# Patient Record
Sex: MALE | Race: WHITE | Employment: UNEMPLOYED | URBAN - METROPOLITAN AREA
[De-identification: names, ages, dates, MRNs, and addresses within clinical notes are randomized per-mention and may not be internally consistent; named-entity substitution may affect disease eponyms.]

---

## 2019-03-30 ENCOUNTER — HOSPITAL ENCOUNTER (EMERGENCY)
Age: 29
Discharge: HOME | End: 2019-03-30
Payer: MEDICARE

## 2019-03-30 VITALS
WEIGHT: 220 LBS | SYSTOLIC BLOOD PRESSURE: 146 MMHG | HEIGHT: 70 IN | OXYGEN SATURATION: 99 % | DIASTOLIC BLOOD PRESSURE: 98 MMHG | TEMPERATURE: 98.2 F | BODY MASS INDEX: 31.5 KG/M2 | RESPIRATION RATE: 16 BRPM | HEART RATE: 63 BPM

## 2019-03-30 DIAGNOSIS — M54.50 ACUTE BILATERAL LOW BACK PAIN WITHOUT SCIATICA: Primary | ICD-10-CM

## 2019-03-30 PROCEDURE — 99282 EMERGENCY DEPT VISIT SF MDM: CPT

## 2019-03-30 RX ORDER — NAPROXEN 500 MG/1
500 TABLET ORAL 2 TIMES DAILY
Qty: 15 TABLET | Refills: 0 | Status: SHIPPED | OUTPATIENT
Start: 2019-03-30

## 2019-03-30 ASSESSMENT — PAIN DESCRIPTION - PAIN TYPE: TYPE: ACUTE PAIN;CHRONIC PAIN

## 2019-03-30 ASSESSMENT — PAIN DESCRIPTION - LOCATION: LOCATION: BACK

## 2019-03-30 ASSESSMENT — PAIN SCALES - GENERAL: PAINLEVEL_OUTOF10: 8

## 2019-03-30 ASSESSMENT — PAIN DESCRIPTION - ORIENTATION: ORIENTATION: LOWER

## 2019-03-30 NOTE — LETTER
[facility] Emergency Department  [address] [place] [address]  Phone: [phone]  Fax: [phone]               March 30, 2019    Patient: Sedrick Joyce   YOB: 1990   Date of Visit: 3/30/2019       To Whom It May Concern:    Sedrick Joyce was seen and treated in our emergency department on 3/30/2019. He may return to work on 4/1/19.       Sincerely,       Kirk Vázquez PA-C         Signature:__________________________________

## 2019-03-30 NOTE — ED PROVIDER NOTES
eMERGENCY dEPARTMENT eNCOUnter      PCP: Maryellen Clayton MD    [facility]    Chief Complaint   Patient presents with    Back Pain     low back pain, intermittent, for a long time, pt has Cerebral Palsy, has been seeing a Chiropractor x 2 weeks       HPI    Young Argueta is a 29 y.o. male who presents with back pain, located in the low back region. The onset was yesterday. Context is patient states she's had off and on back pain for years. Patient states back pain flared up yesterday with no known cause. 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 Patient states he took Tylenol which has helped with pain . The duration has been since the onset. The quality of the pain is achy and sharp. The pain does not radiate. The pain worsens with movement. No known alleviating factors. Patient has history of cerebral palsy, denies any changes in extremity weakness or gait. REVIEW OF SYSTEMS    Constitutional:  Denies fever, chills. Denies history of IVDA. Cardiovascular:  Denies chest pain, palpitations or swelling  Respiratory:  Denies cough or shortness of breath    GI:  Denies abdominal pain, nausea, vomiting, or diarrhea  :  Denies any urinary symptoms  Musculoskeletal:  See HPI above   Skin:  Denies rash  Neurologic:   No bowel incontinence or bladder retention, No saddle anesthesia, No radicular symptoms. No lower extremity weakness or altered sensation.   Endocrine:  Denies polyuria or polydypsia   Lymphatic:  Denies swollen glands     All other review of systems are negative  See HPI and nursing notes for additional information       PAST MEDICAL & SURGICAL HISTORY    Past Medical History:   Diagnosis Date    Bipolar 1 disorder ([address]     Cerebral palsy ([address]      Past Surgical History:   Procedure Laterality Date    CYST REMOVAL      right face    INGUINAL HERNIA REPAIR Left        CURRENT MEDICATIONS    Current Outpatient Rx Medication Sig Dispense Refill    naproxen (NAPROSYN) 500 MG tablet Take 1 tablet by mouth 2 times daily 15 tablet 0    ondansetron (ZOFRAN ODT) 4 MG disintegrating tablet Take 1 tablet by mouth every 6 hours 10 tablet 1    famotidine (PEPCID) 20 MG tablet Take 1 tablet by mouth 2 times daily 60 tablet 1    albuterol sulfate HFA (PROVENTIL HFA) 108 (90 BASE) MCG/ACT inhaler Inhale 2 puffs into the lungs every 4 hours as needed for Wheezing or Shortness of Breath With spacer (and mask if indicated). Thanks.  1 Inhaler 1       ALLERGIES    Allergies   Allergen Reactions    Percocet [Oxycodone-Acetaminophen] Itching       SOCIAL HISTORY    Social History     Socioeconomic History    Marital status: Single     Spouse name: None    Number of children: None    Years of education: None    Highest education level: None   Occupational History    Occupation:  for [employer]   Social Needs    Financial resource strain: None    Food insecurity:     Worry: None     Inability: None    Transportation needs:     Medical: None     Non-medical: None   Tobacco Use    Smoking status: Former Smoker     Types: Cigarettes     Last attempt to quit: 8/24/2015     Years since quitting: 3.6    Smokeless tobacco: Never Used    Tobacco comment: using vape pen   Substance and Sexual Activity    Alcohol use: No     Alcohol/week: 0.0 oz     Comment: occ    Drug use: Yes     Types: Marijuana    Sexual activity: None   Lifestyle    Physical activity:     Days per week: None     Minutes per session: None    Stress: None   Relationships    Social connections:     Talks on phone: None     Gets together: None     Attends Synagogue service: None     Active member of club or organization: None     Attends meetings of clubs or organizations: None     Relationship status: None    Intimate partner violence:     Fear of current or ex partner: None     Emotionally abused: None     Physically abused: None     Forced sexual activity: None   Other Topics Concern    None   Social History Narrative    None       PHYSICAL EXAM    VITAL SIGNS: BP (!) 146/98   Pulse 63   Temp 98.2 °F (36.8 °C) (Oral)   Resp 16   Ht 5' 10\" (1.778 m)   Wt 220 lb (99.8 kg)   SpO2 99%   BMI 31.57 kg/m²    Patient was noted to be afebrile    Constitutional:  Well developed, well nourished. HENT:  Atraumatic,  Moist mucus membranes. Eyes:  No orbital trauma. No scleral icterus. Neck: No JVD, supple. No enlarged lymph nodes. Cardiovascular:  Normal rate, regular rhythm, no murmurs/rubs/gallops  Respiratory:  No respiratory distress, normal breath sounds. Abdomen: Bowel sounds normal, Soft, No tenderness, no masses. Musculoskeletal:  No edema, no deformities. Back:   - No gross deformity, swelling, or discoloration. - no tenderness without masses, fluctuance, warmth, or skin changes.  - No localized midline bony tenderness.   - No change in pain with forward flexion  - SLR test negative     No CVA tenderness to percussion    Integument:  Well hydrated, no rash, no pallor. Neurologic:    No obvious neurological deficits. Patient moves without difficulty or weakness. Motor & Sensation intact bilateral lower extremities. Patella and Achilles reflexes 2+ bilaterally  Vascular:  Distal pulses intact bilateral lower extremities      ED COURSE & MEDICAL DECISION MAKING       Vital signs and nursing notes reviewed during ED course. I have independently evaluated this patient . Supervising MD present in the Emergency Department, available for consultation, throughout entirety of  patient care. All pertinent Lab data and radiographic results reviewed with patient at bedside. The patient and/or the family were informed of the results of any tests/labs/imaging, the treatment plan, and time was allotted to answer questions. Patient presents as above. Patient denies bowel and urinary retention, saddle anesthesia.   Patient denies any new weakness or numbness to lower extremities. There is no tenderness on my exam.  Patient states pain worsens when he is up moving. There is no abdominal tenderness. Patient states he's had this for years. Patient states he recently had x-rays with chiropractor. Patient agrees with holding off on any further lab work or imaging at this time. Patient will be provided naproxen. Recommend following up with primary care in 3-5 days for recheck. Clinical  IMPRESSION    1. Acute bilateral low back pain without sciatica        Diagnosis and plan discussed in detail with patient who understands and agrees. Patient agrees to return emergency department if symptoms worsen or any new symptoms develop. Disposition referral (if applicable):  [address]  [address]  [address]  Schedule an appointment as soon as possible for a visit in 3 days  For Recheck, Return to ED if symptoms change or worsen    Disposition medications (if applicable):  New Prescriptions    NAPROXEN (NAPROSYN) 500 MG TABLET    Take 1 tablet by mouth 2 times daily         Comment: Please note this report has been produced using speech recognition software and may contain errors related to that system including errors in grammar, punctuation, and spelling, as well as words and phrases that may be inappropriate. If there are any questions or concerns please feel free to contact the dictating provider for clarification.       Radha Garcia PA-C  03/30/19 1640